# Patient Record
Sex: FEMALE | Race: BLACK OR AFRICAN AMERICAN
[De-identification: names, ages, dates, MRNs, and addresses within clinical notes are randomized per-mention and may not be internally consistent; named-entity substitution may affect disease eponyms.]

---

## 2018-07-18 ENCOUNTER — HOSPITAL ENCOUNTER (EMERGENCY)
Dept: HOSPITAL 62 - ER | Age: 48
Discharge: HOME | End: 2018-07-18
Payer: SELF-PAY

## 2018-07-18 VITALS — DIASTOLIC BLOOD PRESSURE: 98 MMHG | SYSTOLIC BLOOD PRESSURE: 181 MMHG

## 2018-07-18 DIAGNOSIS — I10: ICD-10-CM

## 2018-07-18 DIAGNOSIS — Z90.710: ICD-10-CM

## 2018-07-18 DIAGNOSIS — Z86.718: ICD-10-CM

## 2018-07-18 DIAGNOSIS — M79.604: Primary | ICD-10-CM

## 2018-07-18 DIAGNOSIS — F17.200: ICD-10-CM

## 2018-07-18 PROCEDURE — 99284 EMERGENCY DEPT VISIT MOD MDM: CPT

## 2018-07-18 PROCEDURE — 93971 EXTREMITY STUDY: CPT

## 2018-07-18 NOTE — RADIOLOGY REPORT (SQ)
EXAM DESCRIPTION:  VENOUS UNILATERAL LOWER



COMPLETED DATE/TIME:  7/18/2018 7:12 pm



REASON FOR STUDY:  right leg pain



COMPARISON:  None.



TECHNIQUE:  Dynamic and static gray scale and color images acquired of the right leg venous system. S
elected spectral images acquired with additional compression and augmentation maneuvers. The contrala
teral common femoral vein and saphenofemoral junction were also imaged. Images stored on PACS.



LIMITATIONS:  None.



FINDINGS:  COMMON FEMORAL: Normal phasicity, compression and augmentation. No visualized echogenic ma
terial on gray scale. No defects on color images.

FEMORAL: Normal compression and augmentation. No visualized echogenic material on gray scale. No defe
cts on color images.

POPLITEAL: Normal compression, augmentation. No visualized echogenic material on gray scale. No defec
ts on color images.

CALF VESSELS: Normal compression, augmentation. No visualized echogenic material on gray scale. No de
fects on color images.

GSV and SSV: Normal compression, augmentation. No visualized echogenic material on gray scale. No def
ects on color images.

ANY DEEP VENOUS INSUFFICIENCY: Not evaluated.

ANY EVIDENCE OF POPLITEAL CYST: No.

OTHER: No other significant finding.

CONTRALATERAL COMMON FEMORAL VEIN AND SAPHENOFEMORAL JUNCTION:

Normal phasicity, compression and augmentation. No visualized echogenic material on gray scale. No de
fects on color images.



IMPRESSION:  NO EVIDENCE OF DVT OR SVT IN THE RIGHT LEG.



TECHNICAL DOCUMENTATION:  JOB ID:  [id number]

[id number]

 2011 [employer]- All Rights Reserved



Reading location - IP/workstation name: [email or web address]

## 2018-07-18 NOTE — ER DOCUMENT REPORT
ED Medical Screen (RME)





- General


Chief Complaint: Leg Pain


Stated Complaint: LEG PAIN


Time Seen by Provider: 07/18/18 17:51


TRAVEL OUTSIDE OF THE [place] IN LAST 30 DAYS: No





- HPI


Patient complains to provider of: Right leg pain


Notes: 





07/18/18 17:57


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 

complaining of pain in the right leg today reminiscent of when she was 

diagnosed with a DVT 3 years ago, not currently on any anticoagulation





- Related Data


Allergies/Adverse Reactions: 


 





No Known Allergies Allergy (Unverified 07/18/18 16:46)


 











Past Medical History





- Social History


Chew tobacco use (# tins/day): No


Frequency of alcohol use: Social


Drug Abuse: None





- Past Medical History


Cardiac Medical History: Reports: Hx Hypertension


Renal/ Medical History: Denies: Hx Peritoneal Dialysis


Past Surgical History: Reports: Hx Hysterectomy





Physical Exam





- Vital signs


Vitals: 





 











Temp Pulse Resp BP Pulse Ox


 


 98.5 F   110 H  14   181/98 H  98 


 


 07/18/18 16:53  07/18/18 16:53  07/18/18 16:53  07/18/18 16:53  07/18/18 16:53














Course





- Vital Signs


Vital signs: 





 











Temp Pulse Resp BP Pulse Ox


 


 98.5 F   110 H  14   181/98 H  98 


 


 07/18/18 16:53  07/18/18 16:53  07/18/18 16:53  07/18/18 16:53  07/18/18 16:53














Doctor's Discharge





- Discharge


Referrals: 


LEONARDO,NO [Primary Care Provider] - Follow up as needed

## 2018-07-18 NOTE — ER DOCUMENT REPORT
ED General





- General


Chief Complaint: Leg Pain


Stated Complaint: LEG PAIN


Time Seen by Provider: 07/18/18 17:51


TRAVEL OUTSIDE OF THE [place] IN LAST 30 DAYS: No





- HPI


Patient complains to provider of: Right leg pain


Notes: 





Patient coming in for right leg pain.  Patient points to the of the anterior 

shin just below the tibial plateau of the area of her pain states sharp 

shooting intermittent.  Patient states is also the area where she was diagnosed 

with DVT in the past.  Patient denies any recent travel denies any fevers 

chills nausea vomiting diarrhea.  Denies any leg swelling.  Patient resting 

comfortably upon my evaluation





- Related Data


Allergies/Adverse Reactions: 


 





No Known Allergies Allergy (Unverified 07/18/18 16:46)


 











Past Medical History





- Social History


Smoking Status: Current Every Day Smoker


Chew tobacco use (# tins/day): No


Frequency of alcohol use: Social


Drug Abuse: None


Family History: Reviewed & Not Pertinent


Patient has suicidal ideation: No


Patient has homicidal ideation: No





- Past Medical History


Cardiac Medical History: Reports: Hx Hypertension


Renal/ Medical History: Denies: Hx Peritoneal Dialysis


Past Surgical History: Reports: Hx Hysterectomy





Review of Systems





- Review of Systems


Constitutional: No symptoms reported


EENT: No symptoms reported


Cardiovascular: No symptoms reported


Respiratory: No symptoms reported


Gastrointestinal: No symptoms reported


Genitourinary: No symptoms reported


Female Genitourinary: No symptoms reported


Musculoskeletal: Other - Leg pain


Skin: No symptoms reported


Hematologic/Lymphatic: No symptoms reported


Neurological/Psychological: No symptoms reported


-: Yes All other systems reviewed and negative





Physical Exam





- Vital signs


Vitals: 


 











Temp Pulse Resp BP Pulse Ox


 


 98.5 F   110 H  14   181/98 H  98 


 


 07/18/18 16:53  07/18/18 16:53  07/18/18 16:53  07/18/18 16:53  07/18/18 16:53











Interpretation: Normal





- General


General appearance: Appears well, Alert





- HEENT


Head: Normocephalic, Atraumatic


Eyes: Normal


Pupils: PERRL





- Respiratory


Respiratory status: No respiratory distress


Chest status: Nontender


Breath sounds: Normal


Chest palpation: Normal





- Cardiovascular


Rhythm: Regular


Heart sounds: Normal auscultation


Murmur: No





- Abdominal


Inspection: Normal


Distension: No distension


Bowel sounds: Normal


Tenderness: Nontender


Organomegaly: No organomegaly





- Back


Back: Normal, Nontender





- Extremities


General upper extremity: Normal inspection, Nontender, Normal color, Normal ROM

, Normal temperature


General lower extremity: Normal inspection, Nontender, Normal color, Normal ROM

, Normal temperature, Normal weight bearing.  No: Nazanin's sign





- Neurological


Neuro grossly intact: Yes


Cognition: Normal


Orientation: AAOx4


[place] Coma Scale Eye Opening: Spontaneous


[place] Coma Scale Verbal: Oriented


[place] Coma Scale Motor: Obeys Commands


[place] Coma Scale Total: 15


Speech: Normal


Motor strength normal: LUE, RUE, LLE, RLE


Sensory: Normal





- Psychological


Associated symptoms: Normal affect, Normal mood





- Skin


Skin Temperature: Warm


Skin Moisture: Dry


Skin Color: Normal





Course





- Re-evaluation


Re-evalutation: 





07/18/18 23:39


bedside Dopplers  negative.  Examinations not reveal any etiology causing the 

patient's pain pain cannot be reproduced on examination recommend lidocaine 

patch follow-up with primary care physician.  More likely etiologies muscle 

skeletal





- Vital Signs


Vital signs: 


 











Temp Pulse Resp BP Pulse Ox


 


 98.5 F   110 H  14   181/98 H  98 


 


 07/18/18 16:53  07/18/18 16:53  07/18/18 16:53  07/18/18 16:53  07/18/18 16:53














Discharge





- Discharge


Clinical Impression: 


Leg pain


Qualifiers:


 Laterality: right Qualified Code(s): M79.604 - Pain in right leg





Condition: Good


Disposition: HOME, SELF-CARE


Instructions:  Leg Pain Nonspecific ([facility])


Additional Instructions: 


At this time your physical examination and ultrasound did not show any signs of 

a DVT.  More likely your pain is muscle skeletal in nature.  Would recommend 

taking 600 mg of Motrin along with 650 mg of Tylenol together 3 times a day for 

pain control.  If you do receive good pain relief with lidocaine patch be 

provided for you here in the ER she may ask her pharmacist about over-the-

counter lidocaine patches.  Follow-up with your primary care physician return 

to ER symptoms worsen.


Prescriptions: 


Ibuprofen [Motrin 600 mg Tablet] 600 mg PO Q8HP PRN #90 tablet


 PRN Reason: 


Referrals: 


LOCALMD,NO [Primary Care Provider] - Follow up as needed